# Patient Record
Sex: FEMALE | Race: WHITE | URBAN - METROPOLITAN AREA
[De-identification: names, ages, dates, MRNs, and addresses within clinical notes are randomized per-mention and may not be internally consistent; named-entity substitution may affect disease eponyms.]

---

## 2020-01-15 ENCOUNTER — APPOINTMENT (RX ONLY)
Dept: URBAN - METROPOLITAN AREA CLINIC 99 | Facility: CLINIC | Age: 85
Setting detail: DERMATOLOGY
End: 2020-01-15

## 2020-01-15 DIAGNOSIS — L82.1 OTHER SEBORRHEIC KERATOSIS: ICD-10-CM

## 2020-01-15 PROBLEM — D48.5 NEOPLASM OF UNCERTAIN BEHAVIOR OF SKIN: Status: ACTIVE | Noted: 2020-01-15

## 2020-01-15 PROCEDURE — ? BIOPSY BY SHAVE METHOD

## 2020-01-15 PROCEDURE — 11102 TANGNTL BX SKIN SINGLE LES: CPT

## 2020-01-15 PROCEDURE — 99202 OFFICE O/P NEW SF 15 MIN: CPT | Mod: 25

## 2020-01-15 ASSESSMENT — LOCATION DETAILED DESCRIPTION DERM: LOCATION DETAILED: MID TRAPEZIAL NECK

## 2020-01-15 ASSESSMENT — LOCATION SIMPLE DESCRIPTION DERM: LOCATION SIMPLE: TRAPEZIAL NECK

## 2020-01-15 ASSESSMENT — LOCATION ZONE DERM: LOCATION ZONE: NECK

## 2020-01-15 NOTE — PROCEDURE: BIOPSY BY SHAVE METHOD
Hide Additional Size Dimension?: No
Depth Of Biopsy: dermis
X Size Of Lesion In Cm: 0
Render Post-Care Instructions In Note?: yes
Lab: [id number] [facility]
Biopsy Type: H and E
Wound Care: Petrolatum
Detail Level: Detailed
Post-Care Instructions: I reviewed with the patient in detail post-care instructions. Patient is to keep the biopsy site dry overnight, and then apply bacitracin twice daily until healed. Patient may apply hydrogen peroxide soaks to remove any crusting.
Type Of Destruction Used: Curettage
Billing Type: [employer]
Anesthesia Volume In Cc (Will Not Render If 0): 0.5
Electrodesiccation Text: The wound bed was treated with electrodesiccation after the biopsy was performed.
Electrodesiccation And Curettage Text: The wound bed was treated with electrodesiccation and curettage after the biopsy was performed.
Consent: The provider's intent is to obtain a tissue sample solely for diagnostic purposes. Written consent was obtained and risks were reviewed including but not limited to scarring, infection, bleeding, scabbing, incomplete removal, nerve damage and allergy to anesthesia.
Anesthesia Type: 1% lidocaine with epinephrine
Dressing: bandage
Biopsy Method: sterile single edge surgical blade
Hemostasis: Erin's
Silver Nitrate Text: The wound bed was treated with silver nitrate after the biopsy was performed.
Notification Instructions: Patient will be notified of biopsy results. However, patient instructed to call the office if not contacted within 2 weeks.
Size Of Lesion In Cm: 1

## 2020-02-17 ENCOUNTER — APPOINTMENT (RX ONLY)
Dept: URBAN - METROPOLITAN AREA CLINIC 99 | Facility: CLINIC | Age: 85
Setting detail: DERMATOLOGY
End: 2020-02-17

## 2020-02-17 PROBLEM — C44.529 SQUAMOUS CELL CARCINOMA OF SKIN OF OTHER PART OF TRUNK: Status: ACTIVE | Noted: 2020-02-17

## 2020-02-17 PROCEDURE — 17263 DSTRJ MAL LES T/A/L 2.1-3.0: CPT

## 2020-02-17 PROCEDURE — ? CURETTAGE AND DESTRUCTION

## 2020-02-17 NOTE — PROCEDURE: CURETTAGE AND DESTRUCTION
Detail Level: Detailed
Biopsy Photograph Reviewed: Yes
Size Of Lesion In Cm: 1.6
Size Of Lesion After Curettage: 2.1
Add Intralesional Injection: No
Total Volume (Ccs): 1
Anesthesia Type: 1% lidocaine with epinephrine
Anesthesia Volume In Cc: 0.5
Cautery Type: Monsel's solution
Number Of Curettages: 3
What Was Performed First?: Curettage
Additional Information: (Optional): The wound was cleaned, and a pressure dressing was applied. The patient received detailed post-op instructions.
Consent was obtained from the patient. The risks, benefits and alternatives to therapy were discussed in detail. Specifically, the risks of infection, scarring, bleeding, prolonged wound healing, nerve injury, incomplete removal, allergy to anesthesia and recurrence were addressed. Alternatives to [facility], such as: surgical removal and XRT were also discussed. Prior to the procedure, the treatment site was clearly identified and confirmed by the patient. All components of Universal Protocol/PAUSE Rule completed.
Post-Care Instructions: I reviewed with the patient in detail post-care instructions. Patient is to keep the area dry for 48 hours, and not to engage in any swimming until the area is healed. Should the patient develop any fevers, chills, bleeding, severe pain patient will contact the office immediately.
Bill As A Line Item Or As Units: Line Item

## 2020-02-17 NOTE — PROCEDURE: MIPS QUALITY
Quality 111:Pneumonia Vaccination Status For Older Adults: Pneumococcal Vaccination Previously Received
Quality 130: Documentation Of Current Medications In The Medical Record: Current Medications Documented
Detail Level: Zone

## 2020-03-18 ENCOUNTER — APPOINTMENT (RX ONLY)
Dept: URBAN - METROPOLITAN AREA CLINIC 99 | Facility: CLINIC | Age: 85
Setting detail: DERMATOLOGY
End: 2020-03-18

## 2020-03-18 DIAGNOSIS — L82.1 OTHER SEBORRHEIC KERATOSIS: ICD-10-CM

## 2020-03-18 DIAGNOSIS — Z85.828 PERSONAL HISTORY OF OTHER MALIGNANT NEOPLASM OF SKIN: ICD-10-CM

## 2020-03-18 PROBLEM — D48.5 NEOPLASM OF UNCERTAIN BEHAVIOR OF SKIN: Status: ACTIVE | Noted: 2020-03-18

## 2020-03-18 PROCEDURE — 99213 OFFICE O/P EST LOW 20 MIN: CPT | Mod: 25

## 2020-03-18 PROCEDURE — ? BIOPSY BY SHAVE METHOD

## 2020-03-18 PROCEDURE — 11102 TANGNTL BX SKIN SINGLE LES: CPT

## 2020-03-18 PROCEDURE — 11103 TANGNTL BX SKIN EA SEP/ADDL: CPT

## 2020-03-18 ASSESSMENT — LOCATION SIMPLE DESCRIPTION DERM
LOCATION SIMPLE: UPPER BACK
LOCATION SIMPLE: LEFT UPPER BACK

## 2020-03-18 ASSESSMENT — LOCATION DETAILED DESCRIPTION DERM
LOCATION DETAILED: INFERIOR THORACIC SPINE
LOCATION DETAILED: LEFT SUPERIOR UPPER BACK

## 2020-03-18 ASSESSMENT — LOCATION ZONE DERM: LOCATION ZONE: TRUNK

## 2020-03-18 NOTE — PROCEDURE: BIOPSY BY SHAVE METHOD
Detail Level: Detailed
Depth Of Biopsy: dermis
Was A Bandage Applied: Yes
Size Of Lesion In Cm: 1
X Size Of Lesion In Cm: 0
Biopsy Type: H and E
Biopsy Method: sterile single edge surgical blade
Anesthesia Type: 1% lidocaine with epinephrine
Anesthesia Volume In Cc (Will Not Render If 0): 0.5
Hemostasis: Erin's
Wound Care: Petrolatum
Dressing: bandage
Destruction After The Procedure: No
Type Of Destruction Used: Curettage
Electrodesiccation Text: The wound bed was treated with electrodesiccation after the biopsy was performed.
Electrodesiccation And Curettage Text: The wound bed was treated with electrodesiccation and curettage after the biopsy was performed.
Silver Nitrate Text: The wound bed was treated with silver nitrate after the biopsy was performed.
Lab: 23 [facility]
Consent: The provider's intent is to obtain a tissue sample solely for diagnostic purposes. Written consent was obtained and risks were reviewed including but not limited to scarring, infection, bleeding, scabbing, incomplete removal, nerve damage and allergy to anesthesia.
Post-Care Instructions: I reviewed with the patient in detail post-care instructions. Patient is to keep the biopsy site dry overnight, and then apply bacitracin twice daily until healed. Patient may apply hydrogen peroxide soaks to remove any crusting.
Notification Instructions: Patient will be notified of biopsy results. However, patient instructed to call the office if not contacted within 2 weeks.
Billing Type: [employer]
Information: Selecting Yes will display possible errors in your note based on the variables you have selected. This validation is only offered as a suggestion for you. PLEASE NOTE THAT THE VALIDATION TEXT WILL BE REMOVED WHEN YOU FINALIZE YOUR NOTE. IF YOU WANT TO FAX A PRELIMINARY NOTE YOU WILL NEED TO TOGGLE THIS TO 'NO' IF YOU DO NOT WANT IT IN YOUR FAXED NOTE.
Lab: [id number]
Billing Type: Third-Party Bill

## 2020-05-28 ENCOUNTER — APPOINTMENT (RX ONLY)
Dept: URBAN - METROPOLITAN AREA CLINIC 99 | Facility: CLINIC | Age: 85
Setting detail: DERMATOLOGY
End: 2020-05-28

## 2020-05-28 DIAGNOSIS — Z02.9 ENCOUNTER FOR ADMINISTRATIVE EXAMINATIONS, UNSPECIFIED: ICD-10-CM

## 2020-05-28 PROCEDURE — ? NO SHOW PLAN

## 2020-05-28 PROCEDURE — ? CURETTAGE AND DESTRUCTION

## 2020-05-28 NOTE — PROCEDURE: CURETTAGE AND DESTRUCTION
Detail Level: Detailed
Biopsy Photograph Reviewed: Yes
Size Of Lesion In Cm: 0.9
Size Of Lesion After Curettage: 1.2
Add Intralesional Injection: No
Total Volume (Ccs): 1
Anesthesia Type: 1% lidocaine with epinephrine
Anesthesia Volume In Cc: 0.5
Cautery Type: Monsel's solution
Number Of Curettages: 3
What Was Performed First?: Curettage
Additional Information: (Optional): The wound was cleaned, and a pressure dressing was applied. The patient received detailed post-op instructions.
Consent was obtained from the patient. The risks, benefits and alternatives to therapy were discussed in detail. Specifically, the risks of infection, scarring, bleeding, prolonged wound healing, nerve injury, incomplete removal, allergy to anesthesia and recurrence were addressed. Alternatives to [facility], such as: surgical removal and XRT were also discussed. Prior to the procedure, the treatment site was clearly identified and confirmed by the patient. All components of Universal Protocol/PAUSE Rule completed.
Post-Care Instructions: I reviewed with the patient in detail post-care instructions. Patient is to keep the area dry for 48 hours, and not to engage in any swimming until the area is healed. Should the patient develop any fevers, chills, bleeding, severe pain patient will contact the office immediately.
Bill As A Line Item Or As Units: Line Item
Size Of Lesion After Curettage: 1.1
Additional Information: (Optional): The wound was cleaned, and a pressure dressing was applied.  The patient received detailed post-op instructions.
Consent was obtained from the patient. The risks, benefits and alternatives to therapy were discussed in detail. Specifically, the risks of infection, scarring, bleeding, prolonged wound healing, nerve injury, incomplete removal, allergy to anesthesia and recurrence were addressed. Alternatives to ED&C, such as: surgical removal and XRT were also discussed.  Prior to the procedure, the treatment site was clearly identified and confirmed by the patient. All components of Universal Protocol/PAUSE Rule completed.

## 2020-06-22 ENCOUNTER — APPOINTMENT (RX ONLY)
Dept: URBAN - METROPOLITAN AREA CLINIC 99 | Facility: CLINIC | Age: 85
Setting detail: DERMATOLOGY
End: 2020-06-22

## 2020-06-22 DIAGNOSIS — Z02.9 ENCOUNTER FOR ADMINISTRATIVE EXAMINATIONS, UNSPECIFIED: ICD-10-CM

## 2020-06-22 PROCEDURE — ? NO SHOW PLAN

## 2020-06-22 PROCEDURE — ? CURETTAGE AND DESTRUCTION

## 2020-06-22 NOTE — PROCEDURE: CURETTAGE AND DESTRUCTION
Detail Level: Detailed
Biopsy Photograph Reviewed: Yes
Size Of Lesion In Cm: 0.9
Size Of Lesion After Curettage: 1.2
Add Intralesional Injection: No
Total Volume (Ccs): 1
Anesthesia Type: 1% lidocaine with epinephrine
Anesthesia Volume In Cc: 0.5
Cautery Type: Monsel's solution
Number Of Curettages: 3
What Was Performed First?: Curettage
Additional Information: (Optional): The wound was cleaned, and a pressure dressing was applied. The patient received detailed post-op instructions.
Consent was obtained from the patient. The risks, benefits and alternatives to therapy were discussed in detail. Specifically, the risks of infection, scarring, bleeding, prolonged wound healing, nerve injury, incomplete removal, allergy to anesthesia and recurrence were addressed. Alternatives to [facility], such as: surgical removal and XRT were also discussed. Prior to the procedure, the treatment site was clearly identified and confirmed by the patient. All components of Universal Protocol/PAUSE Rule completed.
Post-Care Instructions: I reviewed with the patient in detail post-care instructions. Patient is to keep the area dry for 48 hours, and not to engage in any swimming until the area is healed. Should the patient develop any fevers, chills, bleeding, severe pain patient will contact the office immediately.
Bill As A Line Item Or As Units: Line Item
Additional Information: (Optional): The wound was cleaned, and a pressure dressing was applied.  The patient received detailed post-op instructions.
Consent was obtained from the patient. The risks, benefits and alternatives to therapy were discussed in detail. Specifically, the risks of infection, scarring, bleeding, prolonged wound healing, nerve injury, incomplete removal, allergy to anesthesia and recurrence were addressed. Alternatives to ED&C, such as: surgical removal and XRT were also discussed.  Prior to the procedure, the treatment site was clearly identified and confirmed by the patient. All components of Universal Protocol/PAUSE Rule completed.

## 2020-11-12 ENCOUNTER — APPOINTMENT (RX ONLY)
Dept: URBAN - METROPOLITAN AREA CLINIC 99 | Facility: CLINIC | Age: 85
Setting detail: DERMATOLOGY
End: 2020-11-12

## 2020-11-12 DIAGNOSIS — Z02.9 ENCOUNTER FOR ADMINISTRATIVE EXAMINATIONS, UNSPECIFIED: ICD-10-CM

## 2020-11-12 PROCEDURE — ? NO SHOW PLAN

## 2024-07-15 ENCOUNTER — APPOINTMENT (RX ONLY)
Dept: URBAN - METROPOLITAN AREA CLINIC 99 | Facility: CLINIC | Age: 89
Setting detail: DERMATOLOGY
End: 2024-07-15

## 2024-07-15 DIAGNOSIS — L85.3 XEROSIS CUTIS: ICD-10-CM

## 2024-07-15 DIAGNOSIS — L57.0 ACTINIC KERATOSIS: ICD-10-CM

## 2024-07-15 DIAGNOSIS — D49.2 NEOPLASM OF UNSPECIFIED BEHAVIOR OF BONE, SOFT TISSUE, AND SKIN: ICD-10-CM

## 2024-07-15 DIAGNOSIS — F42.4 EXCORIATION (SKIN-PICKING) DISORDER: ICD-10-CM | Status: INADEQUATELY CONTROLLED

## 2024-07-15 DIAGNOSIS — Z85.828 PERSONAL HISTORY OF OTHER MALIGNANT NEOPLASM OF SKIN: ICD-10-CM

## 2024-07-15 PROCEDURE — ? BIOPSY BY SHAVE METHOD

## 2024-07-15 PROCEDURE — 11102 TANGNTL BX SKIN SINGLE LES: CPT

## 2024-07-15 PROCEDURE — 11103 TANGNTL BX SKIN EA SEP/ADDL: CPT

## 2024-07-15 PROCEDURE — 17000 DESTRUCT PREMALG LESION: CPT | Mod: 59

## 2024-07-15 PROCEDURE — ? COUNSELING

## 2024-07-15 PROCEDURE — ? LIQUID NITROGEN

## 2024-07-15 PROCEDURE — 99204 OFFICE O/P NEW MOD 45 MIN: CPT | Mod: 25

## 2024-07-15 PROCEDURE — ? PRESCRIPTION MEDICATION MANAGEMENT

## 2024-07-15 PROCEDURE — ? PRESCRIPTION

## 2024-07-15 RX ORDER — MUPIROCIN 20 MG/G
OINTMENT TOPICAL
Qty: 22 | Refills: 0 | Status: ERX | COMMUNITY
Start: 2024-07-15

## 2024-07-15 RX ADMIN — MUPIROCIN: 20 OINTMENT TOPICAL at 00:00

## 2024-07-15 ASSESSMENT — LOCATION DETAILED DESCRIPTION DERM
LOCATION DETAILED: RIGHT MEDIAL UPPER BACK
LOCATION DETAILED: LEFT SUPERIOR UPPER BACK
LOCATION DETAILED: RIGHT SUPERIOR UPPER BACK
LOCATION DETAILED: LEFT PROXIMAL PRETIBIAL REGION
LOCATION DETAILED: LEFT INFERIOR ANTERIOR NECK
LOCATION DETAILED: RIGHT SUPERIOR UPPER BACK
LOCATION DETAILED: INFERIOR THORACIC SPINE
LOCATION DETAILED: RIGHT ANTERIOR SHOULDER
LOCATION DETAILED: SUPERIOR THORACIC SPINE
LOCATION DETAILED: LEFT SUPERIOR ANTERIOR NECK
LOCATION DETAILED: RIGHT PROXIMAL PRETIBIAL REGION
LOCATION DETAILED: LEFT MEDIAL SUPERIOR CHEST
LOCATION DETAILED: LEFT INFERIOR LATERAL NECK

## 2024-07-15 ASSESSMENT — LOCATION ZONE DERM
LOCATION ZONE: TRUNK
LOCATION ZONE: LEG
LOCATION ZONE: ARM
LOCATION ZONE: NECK
LOCATION ZONE: TRUNK

## 2024-07-15 ASSESSMENT — LOCATION SIMPLE DESCRIPTION DERM
LOCATION SIMPLE: RIGHT SHOULDER
LOCATION SIMPLE: LEFT ANTERIOR NECK
LOCATION SIMPLE: LEFT UPPER BACK
LOCATION SIMPLE: RIGHT UPPER BACK
LOCATION SIMPLE: RIGHT PRETIBIAL REGION
LOCATION SIMPLE: RIGHT UPPER BACK
LOCATION SIMPLE: LEFT PRETIBIAL REGION
LOCATION SIMPLE: UPPER BACK
LOCATION SIMPLE: CHEST

## 2024-07-15 NOTE — PROCEDURE: PRESCRIPTION MEDICATION MANAGEMENT
Initiate Treatment: mupirocin 2 % topical ointment, Apply once daily to the biopsy sites until healed.
Render In Strict Bullet Format?: Yes
Detail Level: Detailed
Initiate Treatment: Rx Mupirocin.  Apply to excoriated areas twice daily for 7 days.

## 2024-07-15 NOTE — PROCEDURE: BIOPSY BY SHAVE METHOD
Detail Level: Detailed
Depth Of Biopsy: dermis
Was A Bandage Applied: Yes
Size Of Lesion In Cm: 4
X Size Of Lesion In Cm: 4.5
Biopsy Type: H and E
Biopsy Method: Personna blade
Anesthesia Type: 1% lidocaine without epinephrine
Anesthesia Volume In Cc: 0.5
Additional Anesthesia Volume In Cc (Will Not Render If 0): 0
Hemostasis: Electrocautery
Wound Care: Vaseline
Dressing: bandage
Destruction After The Procedure: No
Type Of Destruction Used: Curettage
Curettage Text: The wound bed was treated with curettage after the biopsy was performed.
Cryotherapy Text: The wound bed was treated with cryotherapy after the biopsy was performed.
Electrodesiccation Text: The wound bed was treated with electrodesiccation after the biopsy was performed.
Electrodesiccation And Curettage Text: The wound bed was treated with electrodesiccation and curettage after the biopsy was performed.
Silver Nitrate Text: The wound bed was treated with silver nitrate after the biopsy was performed.
Lab: [id number]
Lab Facility: [id number]
Path Notes (To The Dermatopathologist): Size of biopsy is 1cm, Size of lesion is 4 x 4.5 cm
Consent: Written consent was obtained and risks were reviewed including but not limited to scarring, infection, bleeding, scabbing, incomplete removal, nerve damage and allergy to anesthesia.
Post-Care Instructions: I reviewed with the patient in detail post-care instructions. Patient is to keep the biopsy site dry overnight, and then apply Vaseline twice daily until healed. Patient may apply hydrogen peroxide soaks to remove any crusting.
Notification Instructions: Patient will be notified of biopsy results. However, patient instructed to call the office if not contacted within 2 weeks.
Billing Type: Third-Party Bill
Information: Selecting Yes will display possible errors in your note based on the variables you have selected. This validation is only offered as a suggestion for you. PLEASE NOTE THAT THE VALIDATION TEXT WILL BE REMOVED WHEN YOU FINALIZE YOUR NOTE. IF YOU WANT TO FAX A PRELIMINARY NOTE YOU WILL NEED TO TOGGLE THIS TO 'NO' IF YOU DO NOT WANT IT IN YOUR FAXED NOTE.
Size Of Lesion In Cm: 3
X Size Of Lesion In Cm: 2
Path Notes (To The Dermatopathologist): Size of biopsy is 1cm, Size of lesion is 3 x 2 cm

## 2024-07-29 ENCOUNTER — APPOINTMENT (RX ONLY)
Dept: URBAN - METROPOLITAN AREA CLINIC 99 | Facility: CLINIC | Age: 89
Setting detail: DERMATOLOGY
End: 2024-07-29

## 2024-07-29 PROBLEM — C44.519 BASAL CELL CARCINOMA OF SKIN OF OTHER PART OF TRUNK: Status: ACTIVE | Noted: 2024-07-29

## 2024-07-29 PROBLEM — C44.612 BASAL CELL CARCINOMA OF SKIN OF RIGHT UPPER LIMB, INCLUDING SHOULDER: Status: ACTIVE | Noted: 2024-07-29

## 2024-07-29 PROBLEM — C44.529 SQUAMOUS CELL CARCINOMA OF SKIN OF OTHER PART OF TRUNK: Status: ACTIVE | Noted: 2024-07-29

## 2024-07-29 PROCEDURE — 17263 DSTRJ MAL LES T/A/L 2.1-3.0: CPT

## 2024-07-29 PROCEDURE — ? SURGICAL DECISION MAKING

## 2024-07-29 PROCEDURE — 17264 DSTRJ MAL LES T/A/L 3.1-4.0: CPT

## 2024-07-29 PROCEDURE — ? CURETTAGE AND DESTRUCTION

## 2024-07-29 PROCEDURE — 99215 OFFICE O/P EST HI 40 MIN: CPT | Mod: 25

## 2024-07-29 PROCEDURE — ? DEFER

## 2024-07-29 NOTE — PROCEDURE: SURGICAL DECISION MAKING
Risk Assessment Explanation (Does Not Render In The Note): Clinical determination of the probability and/or consequences of an event, such as surgery. Clinical assessment of the level of risk is affected by the nature of the event under consideration for the patient. Modifier 57 is used to indicate an Evaluation and Management (E/M) service resulted in the initial decision to perform surgery either the day before a major surgery (90 day global) or the day of a major surgery.
Discussion: Procedure, alternatives, and risks discussed.
Complexity (Necessary For Coding; Major - 90 Day Global With Some Exceptions; Minor - 10 Day Global): major
Surgery/Procedure Discussed: Mohs
Identified Risk Factors Documented?: yes

## 2024-07-29 NOTE — PROCEDURE: DEFER
Introduction Text (Please End With A Colon): The following procedure was deferred:
Instructions (Optional): Task sent to [facility] for scheduling
Size Of Lesion In Cm (Optional): 0
Detail Level: Detailed
Procedure To Be Performed At Next Visit: Mohs surgery
Instructions (Optional): Lesion C- Right superior upper back
Procedure To Be Performed At Next Visit: ED&C
Detail Level: Simple

## 2024-07-30 ENCOUNTER — APPOINTMENT (RX ONLY)
Dept: URBAN - METROPOLITAN AREA CLINIC 98 | Facility: CLINIC | Age: 89
Setting detail: DERMATOLOGY
End: 2024-07-30

## 2024-07-30 DIAGNOSIS — Z01.818 ENCOUNTER FOR OTHER PREPROCEDURAL EXAMINATION: ICD-10-CM

## 2024-07-30 PROCEDURE — ? ADDITIONAL NOTES

## 2024-07-30 NOTE — PROCEDURE: ADDITIONAL NOTES
Render Risk Assessment In Note?: no
Additional Notes: Performing Provider: ROSANA BENTLEY MD\\nRepairing Provider (if applicable):\\nProcedure: MOHS\\nSurgical Location: LEFT MEDIAL SUPERIOR CHEST\\nDiagnosis (per pathology report): SCC\\nSize of lesion (size provided on pathology report):\\n\\nSee a physician for any heart conditions (If yes, who and for what): N\\nArtificial Heart Valves: N\\nMitral valve prolapse: N\\nHeart Murmur: N\\nPacemaker: N\\nDefibrillator: N\\nArtificial joints (if yes, indicate location and year): N\\nDoes the patient pre-op medicate with antibiotics (if yes, what medication): N\\n**Pharmacy: N\\nHistory of renal disease or on dialysis: N\\nHistory of liver disease: N\\nHistory of bleeding disorder: N\\nLow platelet count (if the patient can provider):N\\nWill patient discontinue blood thinners, including NSAIDS (Advil, Motrin, ibuprofen & fish oil) (discontinue only by provider order):N\\nImmunosuppressed: N\\nPatient verbalizes understanding of pre-operative instructions: Y\\n\\nNotes (if applicable): PT'S AIDE (NISREEN) ANSWERED NO TO ALL QUESTIONS. ONLY A THYROID & CHOLESTEROL MED.
Detail Level: Simple

## 2024-09-13 ENCOUNTER — RX ONLY (OUTPATIENT)
Age: 89
Setting detail: RX ONLY
End: 2024-09-13

## 2024-09-13 RX ORDER — MUPIROCIN 20 MG/G
OINTMENT TOPICAL
Qty: 22 | Refills: 1 | Status: ERX

## 2024-09-23 ENCOUNTER — APPOINTMENT (RX ONLY)
Dept: URBAN - METROPOLITAN AREA CLINIC 99 | Facility: CLINIC | Age: 89
Setting detail: DERMATOLOGY
End: 2024-09-23

## 2024-09-23 DIAGNOSIS — Z85.828 PERSONAL HISTORY OF OTHER MALIGNANT NEOPLASM OF SKIN: ICD-10-CM

## 2024-09-23 DIAGNOSIS — F03.918 UNSPECIFIED DEMENTIA, UNSPECIFIED SEVERITY, WITH OTHER BEHAVIORAL DISTURBANCE: ICD-10-CM | Status: INADEQUATELY CONTROLLED

## 2024-09-23 PROBLEM — C44.519 BASAL CELL CARCINOMA OF SKIN OF OTHER PART OF TRUNK: Status: ACTIVE | Noted: 2024-09-23

## 2024-09-23 PROBLEM — C44.529 SQUAMOUS CELL CARCINOMA OF SKIN OF OTHER PART OF TRUNK: Status: ACTIVE | Noted: 2024-09-23

## 2024-09-23 PROBLEM — R54 AGE-RELATED PHYSICAL DEBILITY: Status: ACTIVE | Noted: 2024-09-23

## 2024-09-23 PROCEDURE — ? ADDITIONAL NOTES

## 2024-09-23 PROCEDURE — ? COUNSELING

## 2024-09-23 PROCEDURE — 99215 OFFICE O/P EST HI 40 MIN: CPT

## 2024-09-23 PROCEDURE — ? SURGICAL DECISION MAKING

## 2024-09-23 PROCEDURE — ? DEFER

## 2024-09-23 PROCEDURE — ? SEPARATE AND IDENTIFIABLE DOCUMENTATION

## 2024-09-23 ASSESSMENT — LOCATION SIMPLE DESCRIPTION DERM
LOCATION SIMPLE: RIGHT UPPER BACK
LOCATION SIMPLE: RIGHT SHOULDER

## 2024-09-23 ASSESSMENT — LOCATION DETAILED DESCRIPTION DERM
LOCATION DETAILED: RIGHT SUPERIOR UPPER BACK
LOCATION DETAILED: RIGHT ANTERIOR SHOULDER

## 2024-09-23 ASSESSMENT — LOCATION ZONE DERM
LOCATION ZONE: TRUNK
LOCATION ZONE: ARM

## 2024-09-23 NOTE — PROCEDURE: DEFER
X Size Of Lesion In Cm (Optional): 4
Reason To Defer Override: referred to general surgery
Scheduling Instructions (Optional): Patient is being referred to general surgery
Instructions (Optional): Size 6cm x 4cm
Introduction Text (Please End With A Colon): The following procedure was deferred:
Size Of Lesion In Cm (Optional): 6
Detail Level: Detailed
X Size Of Lesion In Cm (Optional): 0
Instructions (Optional): Size 3 cm x 3 cm
Reason To Defer Override: Patient referred to general surgery
Scheduling Instructions (Optional): Patient needs to schedule with general surgeon

## 2024-09-23 NOTE — PROCEDURE: SURGICAL DECISION MAKING
Initial Decision For Surgery?: No
Surgery/Procedure Discussed: Referred to general surgery
Risk Assessment Explanation (Does Not Render In The Note): Clinical determination of the probability and/or consequences of an event, such as surgery. Clinical assessment of the level of risk is affected by the nature of the event under consideration for the patient. Modifier 57 is used to indicate an Evaluation and Management (E/M) service resulted in the initial decision to perform surgery either the day before a major surgery (90 day global) or the day of a major surgery.
Discussion: Procedure, alternatives, and risks discussed.

## 2024-09-23 NOTE — PROCEDURE: MIPS QUALITY
29-Jan-2019 20:07
Quality 47: Advance Care Plan: Advance Care Planning discussed and documented; advance care plan or surrogate decision maker documented in the medical record.
Quality 226: Preventive Care And Screening: Tobacco Use: Screening And Cessation Intervention: Patient screened for tobacco use and is an ex/non-smoker
Detail Level: Detailed

## 2024-09-23 NOTE — PROCEDURE: ADDITIONAL NOTES
Patient Management Risk Assessment: High
Render Risk Assessment In Note?: yes
Detail Level: Simple
Additional Notes: Patient states she was \"going to punch you out\" if staff touched her.
Report given to Clementine HI. Patient is stable no distress noted.

## 2024-09-23 NOTE — PROCEDURE: COUNSELING
Detail Level: Detailed
Patient Specific Counseling (Will Not Stick From Patient To Patient): ED&C 7/29/24

## 2024-12-27 ENCOUNTER — APPOINTMENT (OUTPATIENT)
Dept: URBAN - METROPOLITAN AREA CLINIC 99 | Facility: CLINIC | Age: 89
Setting detail: DERMATOLOGY
End: 2024-12-27

## 2024-12-27 DIAGNOSIS — F03.918 UNSPECIFIED DEMENTIA, UNSPECIFIED SEVERITY, WITH OTHER BEHAVIORAL DISTURBANCE: ICD-10-CM

## 2024-12-27 DIAGNOSIS — L57.0 ACTINIC KERATOSIS: ICD-10-CM

## 2024-12-27 DIAGNOSIS — Z85.828 PERSONAL HISTORY OF OTHER MALIGNANT NEOPLASM OF SKIN: ICD-10-CM

## 2024-12-27 PROBLEM — R54 AGE-RELATED PHYSICAL DEBILITY: Status: ACTIVE | Noted: 2024-12-27

## 2024-12-27 PROCEDURE — 17000 DESTRUCT PREMALG LESION: CPT

## 2024-12-27 PROCEDURE — 17003 DESTRUCT PREMALG LES 2-14: CPT

## 2024-12-27 PROCEDURE — ? LIQUID NITROGEN

## 2024-12-27 PROCEDURE — ? COUNSELING

## 2024-12-27 PROCEDURE — ? ADDITIONAL NOTES

## 2024-12-27 PROCEDURE — 99215 OFFICE O/P EST HI 40 MIN: CPT | Mod: 25

## 2024-12-27 PROCEDURE — ? PRESCRIPTION

## 2024-12-27 PROCEDURE — ? SEPARATE AND IDENTIFIABLE DOCUMENTATION

## 2024-12-27 PROCEDURE — ? PRESCRIPTION MEDICATION MANAGEMENT

## 2024-12-27 RX ORDER — MUPIROCIN 20 MG/G
OINTMENT TOPICAL
Qty: 15 | Refills: 0 | Status: ERX

## 2024-12-27 ASSESSMENT — LOCATION DETAILED DESCRIPTION DERM
LOCATION DETAILED: RIGHT MEDIAL UPPER BACK
LOCATION DETAILED: LEFT MEDIAL SUPERIOR CHEST
LOCATION DETAILED: LEFT SUPERIOR MEDIAL UPPER BACK
LOCATION DETAILED: LEFT CENTRAL LATERAL NECK
LOCATION DETAILED: RIGHT ANTERIOR SHOULDER
LOCATION DETAILED: RIGHT SUPERIOR UPPER BACK
LOCATION DETAILED: RIGHT MID-UPPER BACK

## 2024-12-27 ASSESSMENT — PAIN INTENSITY VAS: HOW INTENSE IS YOUR PAIN 0 BEING NO PAIN, 10 BEING THE MOST SEVERE PAIN POSSIBLE?: NO PAIN

## 2024-12-27 ASSESSMENT — LOCATION ZONE DERM
LOCATION ZONE: TRUNK
LOCATION ZONE: ARM
LOCATION ZONE: NECK

## 2024-12-27 ASSESSMENT — LOCATION SIMPLE DESCRIPTION DERM
LOCATION SIMPLE: RIGHT UPPER BACK
LOCATION SIMPLE: LEFT UPPER BACK
LOCATION SIMPLE: RIGHT SHOULDER
LOCATION SIMPLE: NECK
LOCATION SIMPLE: CHEST

## 2024-12-27 ASSESSMENT — TOTAL NUMBER OF LESIONS: # OF LESIONS?: 4

## 2024-12-27 NOTE — PROCEDURE: LIQUID NITROGEN
Consent: The patient's consent was obtained including but not limited to risks of crusting, scabbing, blistering, scarring, darker or lighter pigmentary change, recurrence, incomplete removal and infection.
Show Aperture Variable?: Yes
Number Of Freeze-Thaw Cycles: 2 freeze-thaw cycles
Detail Level: Detailed
Duration Of Freeze Thaw-Cycle (Seconds): 6
Post-Care Instructions: I reviewed with the patient in detail post-care instructions. Patient is to wear sunprotection, and avoid picking at any of the treated lesions. Pt may apply Vaseline to crusted or scabbing areas.

## 2024-12-27 NOTE — PROCEDURE: ADDITIONAL NOTES
Render Risk Assessment In Note?: yes
Patient Management Risk Assessment: High
Additional Notes: Patient presents for today’s appointment with her caregiver. Patient has difficulty tolerating procedures.
Detail Level: Simple

## 2024-12-27 NOTE — PROCEDURE: PRESCRIPTION MEDICATION MANAGEMENT
Render In Strict Bullet Format?: Yes
Initiate Treatment: .\\n\\nmupirocin 2 % topical ointment, Apply to affected area on neck, right shoulder, and back bid for one week
Detail Level: Detailed

## 2025-05-06 NOTE — HPI: EVALUATION OF SKIN LESION(S)
What Type Of Note Output Would You Prefer (Optional)?: Standard Output
How Severe Are Your Spot(S)?: mild
Have Your Spot(S) Been Treated In The Past?: has not been treated
Hpi Title: Evaluation of Skin Lesions
musculoskeletal